# Patient Record
Sex: MALE | Race: WHITE
[De-identification: names, ages, dates, MRNs, and addresses within clinical notes are randomized per-mention and may not be internally consistent; named-entity substitution may affect disease eponyms.]

---

## 2020-01-27 ENCOUNTER — HOSPITAL ENCOUNTER (EMERGENCY)
Dept: HOSPITAL 62 - ER | Age: 30
LOS: 1 days | Discharge: HOME | End: 2020-01-28
Payer: SELF-PAY

## 2020-01-27 DIAGNOSIS — Z87.442: ICD-10-CM

## 2020-01-27 DIAGNOSIS — F39: ICD-10-CM

## 2020-01-27 DIAGNOSIS — M79.10: Primary | ICD-10-CM

## 2020-01-27 DIAGNOSIS — R53.1: ICD-10-CM

## 2020-01-27 DIAGNOSIS — R45.851: ICD-10-CM

## 2020-01-27 DIAGNOSIS — F32.9: ICD-10-CM

## 2020-01-27 DIAGNOSIS — F17.200: ICD-10-CM

## 2020-01-27 LAB
A TYPE INFLUENZA AG: NEGATIVE
ADD MANUAL DIFF: NO
ALBUMIN SERPL-MCNC: 4.6 G/DL (ref 3.5–5)
ALP SERPL-CCNC: 64 U/L (ref 38–126)
ANION GAP SERPL CALC-SCNC: 9 MMOL/L (ref 5–19)
APAP SERPL-MCNC: < 10 UG/ML (ref 10–30)
APPEARANCE UR: CLEAR
APTT PPP: YELLOW S
AST SERPL-CCNC: 27 U/L (ref 17–59)
B INFLUENZA AG: NEGATIVE
BARBITURATES UR QL SCN: NEGATIVE
BASOPHILS # BLD AUTO: 0.1 10^3/UL (ref 0–0.2)
BASOPHILS NFR BLD AUTO: 1.3 % (ref 0–2)
BILIRUB DIRECT SERPL-MCNC: 0 MG/DL (ref 0–0.4)
BILIRUB SERPL-MCNC: 0.3 MG/DL (ref 0.2–1.3)
BILIRUB UR QL STRIP: NEGATIVE
BUN SERPL-MCNC: 13 MG/DL (ref 7–20)
CALCIUM: 9.9 MG/DL (ref 8.4–10.2)
CHLORIDE SERPL-SCNC: 102 MMOL/L (ref 98–107)
CO2 SERPL-SCNC: 29 MMOL/L (ref 22–30)
EOSINOPHIL # BLD AUTO: 0.2 10^3/UL (ref 0–0.6)
EOSINOPHIL NFR BLD AUTO: 2 % (ref 0–6)
ERYTHROCYTE [DISTWIDTH] IN BLOOD BY AUTOMATED COUNT: 13.4 % (ref 11.5–14)
ETHANOL SERPL-MCNC: < 10 MG/DL
GLUCOSE SERPL-MCNC: 92 MG/DL (ref 75–110)
GLUCOSE UR STRIP-MCNC: NEGATIVE MG/DL
HCT VFR BLD CALC: 46.3 % (ref 37.9–51)
HGB BLD-MCNC: 16.3 G/DL (ref 13.5–17)
KETONES UR STRIP-MCNC: NEGATIVE MG/DL
LYMPHOCYTES # BLD AUTO: 1 10^3/UL (ref 0.5–4.7)
LYMPHOCYTES NFR BLD AUTO: 11.8 % (ref 13–45)
MCH RBC QN AUTO: 32.4 PG (ref 27–33.4)
MCHC RBC AUTO-ENTMCNC: 35.1 G/DL (ref 32–36)
MCV RBC AUTO: 92 FL (ref 80–97)
METHADONE UR QL SCN: NEGATIVE
MONOCYTES # BLD AUTO: 0.8 10^3/UL (ref 0.1–1.4)
MONOCYTES NFR BLD AUTO: 9.7 % (ref 3–13)
NEUTROPHILS # BLD AUTO: 6.1 10^3/UL (ref 1.7–8.2)
NEUTS SEG NFR BLD AUTO: 75.2 % (ref 42–78)
NITRITE UR QL STRIP: NEGATIVE
PCP UR QL SCN: NEGATIVE
PH UR STRIP: 6 [PH] (ref 5–9)
PLATELET # BLD: 222 10^3/UL (ref 150–450)
POTASSIUM SERPL-SCNC: 4.5 MMOL/L (ref 3.6–5)
PROT SERPL-MCNC: 7.6 G/DL (ref 6.3–8.2)
PROT UR STRIP-MCNC: NEGATIVE MG/DL
RBC # BLD AUTO: 5.03 10^6/UL (ref 4.35–5.55)
SALICYLATES SERPL-MCNC: < 1 MG/DL (ref 2–20)
SP GR UR STRIP: 1.02
TOTAL CELLS COUNTED % (AUTO): 100 %
URINE AMPHETAMINES SCREEN: NEGATIVE
URINE BENZODIAZEPINES SCREEN: NEGATIVE
URINE COCAINE SCREEN: NEGATIVE
URINE MARIJUANA (THC) SCREEN: (no result)
UROBILINOGEN UR-MCNC: NEGATIVE MG/DL (ref ?–2)
WBC # BLD AUTO: 8.1 10^3/UL (ref 4–10.5)

## 2020-01-27 PROCEDURE — 81001 URINALYSIS AUTO W/SCOPE: CPT

## 2020-01-27 PROCEDURE — 80307 DRUG TEST PRSMV CHEM ANLYZR: CPT

## 2020-01-27 PROCEDURE — 93010 ELECTROCARDIOGRAM REPORT: CPT

## 2020-01-27 PROCEDURE — 85025 COMPLETE CBC W/AUTO DIFF WBC: CPT

## 2020-01-27 PROCEDURE — 99285 EMERGENCY DEPT VISIT HI MDM: CPT

## 2020-01-27 PROCEDURE — 93005 ELECTROCARDIOGRAM TRACING: CPT

## 2020-01-27 PROCEDURE — 36415 COLL VENOUS BLD VENIPUNCTURE: CPT

## 2020-01-27 PROCEDURE — 80053 COMPREHEN METABOLIC PANEL: CPT

## 2020-01-27 PROCEDURE — 87804 INFLUENZA ASSAY W/OPTIC: CPT

## 2020-01-27 NOTE — EKG REPORT
SEVERITY:- ABNORMAL ECG -

SINUS RHYTHM

PROBABLE LEFT VENTRICULAR HYPERTROPHY

:

Confirmed by: Dominick Boswell 27-Jan-2020 20:54:35

## 2020-01-27 NOTE — PSYCHOLOGICAL NOTE
Psych Note





- Psych Note


Date seen by psych provider: 01/27/20


Time seen by psych provider: 10:40


Psych Note: 


Reason for consult: Suicidal ideation





Patient presented to [facility] ED with concerns that he is getting physically sick in 

addition to suicidal ideation.  Patient reportedly texted his mother this 

morning that he wanted to die.  Patient denies having a plan however is very 

guarded and resistant to engaging with clinician.  Patient requests a "second 

chance."  He admits he had thoughts of harming himself about 6 months ago but 

has not engaged in any metal health services. He report He reports the thoughts 

of harming himself 6 months ago were "not as bad" as his current thoughts.  

Patient is very emotionally labile.  He originally refuses to cooperate with [facility]

ED staff to change out; however, he does consent after talking with clinician.  

Patient identifies both stress from family, stating that he feels unwanted, and 

has suffered the recent lost a a close friend to suicide 2 weeks ago.  Patient 

is recommended for 24 hour petition for evaluation; evaluation is currently 

ongoing.

## 2020-01-27 NOTE — ER DOCUMENT REPORT
ED Medical Screen (RME)





- General


Chief Complaint: Psych Problem


Stated Complaint: PSYCH EVAL


Time Seen by Provider: 01/27/20 10:18


Notes: 





Patient is a 29-year-old male who presents emergency department with a chief 

complaint of body aches and depression.  Patient reports over the past 24 hours 

he was recently exposed to the influenza virus and he has had body aches and 

weakness.  Patient reports he did vomit 3 times this morning.  Patient reports a

cough.  Patient denies diarrhea or fever.





Brother-in-law is at the patient's side in triage and states the patient texted 

him this morning stating that he was thinking about ending his life today.  

Patient does admit to this.  Patient reports that he does not have a specific 

plan and would never actually hurt himself because he knows he has "a lot of 

life to live and would not want to hurt his family."  Brother-in-law states that

this is the third time in the past month that the patient has expressed concern 

about his depression and "a cry for help."  Patient denies suicidal attempt in 

the past.  Patient reports that he is having a tough time fixing his problems.  

Patient recently had a friend who committed suicide about 2 weeks ago.  Patient 

reports at times "he feels like he cannot do nothing right." 


TRAVEL OUTSIDE OF THE [place] IN LAST 30 DAYS: No





- Related Data


Allergies/Adverse Reactions: 


                                        





No Known Allergies Allergy (Verified 12/17/16 08:05)


   











Past Medical History





- Social History


Drug Abuse: Marijuana


Renal/ Medical History: Reports: Hx Kidney Stones





Physical Exam





- Vital signs


Vitals: 





                                        











Temp Pulse Resp BP Pulse Ox


 


 98.2 F   98   16   149/94 H  100 


 


 01/27/20 09:13  01/27/20 09:13  01/27/20 09:13  01/27/20 09:13  01/27/20 09:13














- Psychological


Associated symptoms: Normal affect





Course





- Re-evaluation


Re-evalutation: 





01/27/20 10:32


We will initiate a psychiatric work-up as well as test for influenza.





I have greeted and performed a rapid initial assessment of this patient.  A 

comprehensive ED assessment and evaluation of the patient, analysis of test 

results and completion of the medical decision making process will be conducted 

by additional ED providers.





- Vital Signs


Vital signs: 





                                        











Temp Pulse Resp BP Pulse Ox


 


 98.2 F   98   16   149/94 H  100 


 


 01/27/20 09:13  01/27/20 09:13  01/27/20 09:13  01/27/20 09:13  01/27/20 09:13

## 2020-01-27 NOTE — PSYCHOLOGICAL NOTE
Psych Note





- Psych Note


Date seen by psych provider: 01/27/20


Time seen by psych provider: 14:45


Psych Note: 





Patient was informed he would be staying overnight on a 24 hour petition for 

evaluation. Patient became upset and demanding to leave. Patient reported he 

would "tear this place apart." Clinician reminded patient that would not change 

the plan and the emotional labially he is exhibiting is part of the  impulsive 

behavior of concern. Patient reported he only sent the texts to his family 

because he was upset that his step father had fired him because he had the flu. 

Patient stated he wanted to "get back at my family." Clinician discussed how 

behavior has consequences. Patient attempted to deflect and minimize situation. 

Patient repeatedly informed clinician he was leaving. Clinician responded each 

time with, that is not happening. Clinician reoriented patient to the multiple 

text messages to family members in which he reported he had planned to commit 

suicide. 





Patient demanded to use the phone to report he is being held against his will to

law enforcement. Clinician refused request and continued that phone call could 

be considered a misuse of 9-1-1 resources as he was brought to the ED for 

treatment. Patient states his family told him he was being treated for the flu, 

not evaluated. 





Clinician utilized Rogerian techniques to offer empathetic support while 

maintaining firm boundaries.  Patient was able to self soothe. Patient continues

to minimize and deny.

## 2020-01-27 NOTE — ER DOCUMENT REPORT
ED Medical Screen (RME)





- General


Chief Complaint: Psych Problem


Stated Complaint: PSYCH EVAL


Time Seen by Provider: 01/27/20 10:18


TRAVEL OUTSIDE OF THE [place] IN LAST 30 DAYS: No





- Related Data


Allergies/Adverse Reactions: 


                                        





No Known Allergies Allergy (Verified 12/17/16 08:05)


   











Past Medical History





- Social History


Drug Abuse: Marijuana


Renal/ Medical History: Reports: Hx Kidney Stones





Physical Exam





- Vital signs


Vitals: 





                                        











Temp Pulse Resp BP Pulse Ox


 


 98.2 F   98   16   149/94 H  100 


 


 01/27/20 09:13  01/27/20 09:13  01/27/20 09:13  01/27/20 09:13  01/27/20 09:13














Course





- Vital Signs


Vital signs: 





                                        











Temp Pulse Resp BP Pulse Ox


 


 98.2 F   98   16   149/94 H  100 


 


 01/27/20 09:13  01/27/20 09:13  01/27/20 09:13  01/27/20 09:13  01/27/20 09:13














- Laboratory


Result Diagrams: 


                                 01/27/20 10:32





                                 01/27/20 10:32


Laboratory results interpreted by me: 





                                        











  01/27/20 01/27/20





  10:32 10:32


 


Lymph % (Auto)  11.8 L 


 


Salicylates   < 1.0 L


 


Acetaminophen   < 10 L

## 2020-01-27 NOTE — ER DOCUMENT REPORT
ED General





- General


Chief Complaint: Psych Problem


Stated Complaint: PSYCH EVAL


Time Seen by Provider: 01/27/20 10:18


TRAVEL OUTSIDE OF THE [place] IN LAST 30 DAYS: No





- HPI


Notes: 





29-year-old male to the emergency department with 2 complaints today.  First 

complaint is that he has been having flulike symptoms since last night.  

[place] a stranger came up to him and

interacted with him.  Apparently the stranger had a runny nose nasal congestion 

and the patient was informed by that strangers mother that he has the flu.  He 

states that later that evening he started to feel poorly and began with cough 

and runny nose.  He took NyQuil and slept well but woke up this morning feeling 

worse.  He states he vomited 3 times and continues to have nasal congestion and 

cough.  He states he has not had a fever.  He states he is concerned he has the 

flu.  He states that he did take DayQuil today and that seems to have helped him

a little bit.  He has a secondary complaint of depression and "worrying a lot 

about the future.  Apparently this morning he reached out to his family via text

message and told them that he wanted to end his life.  He tells me today in the 

emergency department that he has been struggling but he does not want to end his

life.  He states that "I should not have sent that message".  He also states 

that he feels like he has a lot to live for but he is just burdened by the 

unknown of his future.  In particular he is very concerned about what will 

happen to his parents and himself after they passed away.  Currently his parents

are healthy and doing well so he is not exactly sure why he is so concerned 

about what will happen after they passed away.  He denies any hallucinations or 

homicidal ideation.








- Related Data


Allergies/Adverse Reactions: 


                                        





No Known Allergies Allergy (Verified 12/17/16 08:05)


   











Past Medical History





- General


Information source: Patient





- Social History


Smoking Status: Current Every Day Smoker


Frequency of alcohol use: None


Drug Abuse: Marijuana


Lives with: Family


Family History: Reviewed & Not Pertinent


Patient has suicidal ideation: No


Patient has homicidal ideation: No


Renal/ Medical History: Reports: Hx Kidney Stones





Review of Systems





- Review of Systems


Constitutional: Chills.  denies: Fever


EENT: See HPI, Nose congestion


Cardiovascular: denies: Chest pain, Palpitations, Heart racing, Dyspnea, 

Syncope, Dizziness, Lightheaded


Respiratory: See HPI, Cough.  denies: Short of breath


Gastrointestinal: See HPI, Nausea, Vomiting.  denies: Abdominal pain, Diarrhea


Musculoskeletal: Muscle pain - Patient reports body aches


Skin: denies: Rash


Neurological/Psychological: No symptoms reported


-: Yes All other systems reviewed and negative





Physical Exam





- Vital signs


Vitals: 


                                        











Temp Pulse Resp BP Pulse Ox


 


 98.2 F   98   16   149/94 H  100 


 


 01/27/20 09:13  01/27/20 09:13  01/27/20 09:13  01/27/20 09:13  01/27/20 09:13














- General


General appearance: Appears well, Alert


In distress: None


Notes: 





Patient initially sleeping when I first entered his room.  He awakens with light

touch.  Noted that he has a left cochlear implant





- HEENT


Head: Normocephalic, Atraumatic


Eyes: Normal


Pupils: PERRL


Ears: Other - Left cochlear implant


External canal: Normal


Tympanic membrane: Normal


Sinus: Normal


Nasal: Clear rhinorrhea


Mouth/Lips: Normal


Mucous membranes: Normal


Pharynx: Normal


Neck: Normal, Supple.  No: Lymphadenopathy, Meningismus





- Respiratory


Respiratory status: No respiratory distress


Chest status: Nontender.  No: Accessory muscle use


Breath sounds: Normal.  No: Rales, Rhonchi, Wheezing


Chest palpation: Normal





- Cardiovascular


Rhythm: Regular


Heart sounds: Normal auscultation


Murmur: No





- Abdominal


Inspection: Normal


Distension: No distension


Bowel sounds: Normal


Tenderness: Nontender.  No: Tender, McBurney's point, Rodriguez's sign, Guarding, 

Rebound


Organomegaly: No organomegaly





- Back


Back: Normal, Nontender





- Neurological


Neuro grossly intact: Yes


Cognition: Normal


Orientation: AAOx4


David Coma Scale Eye Opening: Spontaneous


[place] Coma Scale Verbal: Oriented


David Coma Scale Motor: Obeys Commands


[place] Coma Scale Total: 15


Speech: Normal


Cranial nerves: Normal


Cerebellar coordination: Normal


Motor strength normal: LUE, RUE, LLE, RLE


Additional motor exam normals: Equal .  No: Pronator drift


Sensory: Normal





- Psychological


Associated symptoms: Normal affect, Normal mood


Notes: 





Patient admits to depression.  He does also admit that he sent text message to 

his family this morning stating that he wanted to end his life.  He vehemently 

states he does not want to end his life but he is very uncertain about his 

future and that is getting him down.  He also admits to anxious thoughts about 

what will become of him once his parents passed away.  He is very forthcoming 

with his feelings and makes good eye contact.  He denies a specific plan.  He 

denies HI or hallucinations.  He has linear thought process.  And he is not 

responding to any internal stimuli.





- Skin


Skin Temperature: Warm


Skin Moisture: Dry


Skin Color: Normal





Course





- Re-evaluation


Re-evalutation: 





01/27/20 


Spoke with behavioral health team.  They will plan to petition for IVC for 24-

hour hold.  Patient has been made aware.





01/28/20 00:11


Patient has been resting quietly in her emergency department this evening and 

today.  He continues to be on IVC petition and will be evaluated again by 

behavioral health in the morning.  Turned patient over to [facility] and

she will continue to monitor him overnight.





- Vital Signs


Vital signs: 


                                        











Temp Pulse Resp BP Pulse Ox


 


 98.0 F   66   20   126/68 H  99 


 


 01/27/20 18:19  01/27/20 18:19  01/27/20 18:19  01/27/20 18:19  01/27/20 18:19














- Laboratory


Result Diagrams: 


                                 01/27/20 10:32





                                 01/27/20 10:32


Laboratory results interpreted by me: 


                                        











  01/27/20 01/27/20





  10:32 10:32


 


Lymph % (Auto)  11.8 L 


 


Salicylates   < 1.0 L


 


Acetaminophen   < 10 L














Discharge





- Discharge


Clinical Impression: 


 Flu-like symptoms, Cough, Mood disorder, Suicidal ideation





Condition: Stable


Disposition: OTHER

## 2020-01-28 VITALS — SYSTOLIC BLOOD PRESSURE: 142 MMHG | DIASTOLIC BLOOD PRESSURE: 88 MMHG

## 2020-01-28 NOTE — ER DOCUMENT REPORT
Doctor's Note


Notes: 





01/28/20 10:48


29-year-old male brought in last night texting thoughts of hurting himself to 

his family members.  Patient states he had been fired by his father-in-law and 

became very angry.  Patient has no physical complaints at this time.  Patient is

currently engaging with the psychiatric team.  Denies thoughts of hurting 

himself at this time

## 2020-01-28 NOTE — PSYCHOLOGICAL NOTE
Psych Note





- Psych Note


Date seen by psych provider: 01/28/20


Time seen by psych provider: 10:30


Psych Note: 


Check in Conducted with patient with brother law at bedside.





Patient expressed remorse for his behavior yesterday when advised of 24-hour 

petition for evaluation.  Patient was engaged with clinician.  Patient 

reaffirmed the text he sent to his mother and brother in law regarding SI was 

done in an attempt to manipulate family.  Patient states he is agreeable to 

mental health services and verbalized insight of his need for emotion regulation

skills.





Brother-in-law stated he did not believe patient was a danger to himself or 

others, and believed patient sent the text to himself and his mother in an 

attempt to "get attention."  Brother-in-law stated the family has been 

attempting to convince patient for approximately 6 months to see a mental health

provider.





Patient identified and asked his brother-in-law to be his plan of care partner, 

brother-in-law excepted. 





Medication recommendations per [facility] contracted psychiatrist Dr. Brissa MD 

are as follows:


Celexa 20MG, daily





Impression/Plan: Patient is recommended for rescind of IVC and is cleared from 

acute psychiatric services. Patient denies suicidal and homicidal ideations. 

There is no observed behavior that suggests patient is responding to internal 

stimuli.  Patient engaged in organized, rational, linear thought processes and 

was able to express needs and wants in a logical manner.  Brother-in-law has 

agreed to be patient's plan of care partner which includes: Being responsible 

for medication management and administration, observing for signs of increased 

emotional distress, removing weapons from the home, facilitating and assisting 

with follow-up mental health appointments. Patient will be staying with 

brother-in-law for the next couple of days.  Patient has mental health ap

pointment with [facility] on Monday, 02/03/2020 at 3:30. Dr. Viera was consulted on 

the care and management of this patient; attending physician is in agreement 

with recommendations and disposition.